# Patient Record
Sex: MALE | Race: WHITE | URBAN - NONMETROPOLITAN AREA
[De-identification: names, ages, dates, MRNs, and addresses within clinical notes are randomized per-mention and may not be internally consistent; named-entity substitution may affect disease eponyms.]

---

## 2017-01-09 ENCOUNTER — COMMUNICATION - GICH (OUTPATIENT)
Dept: FAMILY MEDICINE | Facility: OTHER | Age: 67
End: 2017-01-09

## 2017-01-09 DIAGNOSIS — M54.9 DORSALGIA: ICD-10-CM

## 2017-07-17 ENCOUNTER — COMMUNICATION - GICH (OUTPATIENT)
Dept: FAMILY MEDICINE | Facility: OTHER | Age: 67
End: 2017-07-17

## 2017-07-17 DIAGNOSIS — M54.9 DORSALGIA: ICD-10-CM

## 2017-07-30 ENCOUNTER — COMMUNICATION - GICH (OUTPATIENT)
Dept: FAMILY MEDICINE | Facility: OTHER | Age: 67
End: 2017-07-30

## 2017-07-30 DIAGNOSIS — N40.0 ENLARGED PROSTATE WITHOUT LOWER URINARY TRACT SYMPTOMS (LUTS): ICD-10-CM

## 2017-08-06 ENCOUNTER — COMMUNICATION - GICH (OUTPATIENT)
Dept: FAMILY MEDICINE | Facility: OTHER | Age: 67
End: 2017-08-06

## 2017-08-06 DIAGNOSIS — N40.0 ENLARGED PROSTATE WITHOUT LOWER URINARY TRACT SYMPTOMS (LUTS): ICD-10-CM

## 2017-08-15 ENCOUNTER — COMMUNICATION - GICH (OUTPATIENT)
Dept: FAMILY MEDICINE | Facility: OTHER | Age: 67
End: 2017-08-15

## 2017-08-15 DIAGNOSIS — I10 ESSENTIAL (PRIMARY) HYPERTENSION: ICD-10-CM

## 2017-08-15 DIAGNOSIS — E78.2 MIXED HYPERLIPIDEMIA: ICD-10-CM

## 2017-08-21 ENCOUNTER — COMMUNICATION - GICH (OUTPATIENT)
Dept: FAMILY MEDICINE | Facility: OTHER | Age: 67
End: 2017-08-21

## 2017-08-21 DIAGNOSIS — E78.2 MIXED HYPERLIPIDEMIA: ICD-10-CM

## 2017-08-21 DIAGNOSIS — I50.22 CHRONIC SYSTOLIC CONGESTIVE HEART FAILURE (H): ICD-10-CM

## 2017-08-21 DIAGNOSIS — I10 ESSENTIAL (PRIMARY) HYPERTENSION: ICD-10-CM

## 2017-09-14 ENCOUNTER — COMMUNICATION - GICH (OUTPATIENT)
Dept: FAMILY MEDICINE | Facility: OTHER | Age: 67
End: 2017-09-14

## 2017-09-14 DIAGNOSIS — I10 ESSENTIAL (PRIMARY) HYPERTENSION: ICD-10-CM

## 2017-09-14 DIAGNOSIS — J44.9 CHRONIC OBSTRUCTIVE PULMONARY DISEASE (H): ICD-10-CM

## 2017-09-21 ENCOUNTER — COMMUNICATION - GICH (OUTPATIENT)
Dept: FAMILY MEDICINE | Facility: OTHER | Age: 67
End: 2017-09-21

## 2017-09-21 DIAGNOSIS — M54.9 DORSALGIA: ICD-10-CM

## 2017-09-28 ENCOUNTER — HISTORY (OUTPATIENT)
Dept: FAMILY MEDICINE | Facility: OTHER | Age: 67
End: 2017-09-28

## 2017-09-28 ENCOUNTER — OFFICE VISIT - GICH (OUTPATIENT)
Dept: FAMILY MEDICINE | Facility: OTHER | Age: 67
End: 2017-09-28

## 2017-09-28 DIAGNOSIS — I10 ESSENTIAL (PRIMARY) HYPERTENSION: ICD-10-CM

## 2017-09-28 DIAGNOSIS — I50.31 ACUTE DIASTOLIC HEART FAILURE (H): ICD-10-CM

## 2017-09-28 DIAGNOSIS — N40.0 ENLARGED PROSTATE WITHOUT LOWER URINARY TRACT SYMPTOMS (LUTS): ICD-10-CM

## 2017-09-28 DIAGNOSIS — E78.2 MIXED HYPERLIPIDEMIA: ICD-10-CM

## 2017-09-28 DIAGNOSIS — I50.22 CHRONIC SYSTOLIC CONGESTIVE HEART FAILURE (H): ICD-10-CM

## 2017-09-28 LAB
A/G RATIO - HISTORICAL: 1.8 (ref 1–2)
ABSOLUTE BASOPHILS - HISTORICAL: 0 THOU/CU MM
ABSOLUTE EOSINOPHILS - HISTORICAL: 0.1 THOU/CU MM
ABSOLUTE IMMATURE GRANULOCYTES(METAS,MYELOS,PROS) - HISTORICAL: 0 THOU/CU MM
ABSOLUTE LYMPHOCYTES - HISTORICAL: 1.8 THOU/CU MM (ref 0.9–2.9)
ABSOLUTE MONOCYTES - HISTORICAL: 0.8 THOU/CU MM
ABSOLUTE NEUTROPHILS - HISTORICAL: 3.2 THOU/CU MM (ref 1.7–7)
ALBUMIN SERPL-MCNC: 4.5 G/DL (ref 3.5–5.7)
ALP SERPL-CCNC: 46 IU/L (ref 34–104)
ALT (SGPT) - HISTORICAL: 28 IU/L (ref 7–52)
ANION GAP - HISTORICAL: 12 (ref 5–18)
AST SERPL-CCNC: 32 IU/L (ref 13–39)
BASOPHILS # BLD AUTO: 0.7 %
BILIRUB SERPL-MCNC: 0.5 MG/DL (ref 0.3–1)
BUN SERPL-MCNC: 14 MG/DL (ref 7–25)
BUN/CREAT RATIO - HISTORICAL: 15
CALCIUM SERPL-MCNC: 10 MG/DL (ref 8.6–10.3)
CHLORIDE SERPLBLD-SCNC: 102 MMOL/L (ref 98–107)
CHOL/HDL RATIO - HISTORICAL: 4.02
CHOLESTEROL TOTAL: 213 MG/DL
CO2 SERPL-SCNC: 24 MMOL/L (ref 21–31)
CREAT SERPL-MCNC: 0.95 MG/DL (ref 0.7–1.3)
EOSINOPHIL NFR BLD AUTO: 1.5 %
ERYTHROCYTE [DISTWIDTH] IN BLOOD BY AUTOMATED COUNT: 15.4 % (ref 11.5–15.5)
GFR IF NOT AFRICAN AMERICAN - HISTORICAL: >60 ML/MIN/1.73M2
GLOBULIN - HISTORICAL: 2.5 G/DL (ref 2–3.7)
GLUCOSE SERPL-MCNC: 111 MG/DL (ref 70–105)
HCT VFR BLD AUTO: 38.8 % (ref 37–53)
HDLC SERPL-MCNC: 53 MG/DL (ref 23–92)
HEMOGLOBIN: 12.8 G/DL (ref 13.5–17.5)
IMMATURE GRANULOCYTES(METAS,MYELOS,PROS) - HISTORICAL: 0.5 %
LDLC SERPL CALC-MCNC: 101 MG/DL
LYMPHOCYTES NFR BLD AUTO: 30.4 % (ref 20–44)
MCH RBC QN AUTO: 32.4 PG (ref 26–34)
MCHC RBC AUTO-ENTMCNC: 33 G/DL (ref 32–36)
MCV RBC AUTO: 98 FL (ref 80–100)
MONOCYTES NFR BLD AUTO: 13.3 %
NEUTROPHILS NFR BLD AUTO: 53.6 % (ref 42–72)
NON-HDL CHOLESTEROL - HISTORICAL: 160 MG/DL
PLATELET # BLD AUTO: 241 THOU/CU MM (ref 140–440)
PMV BLD: 9.1 FL (ref 6.5–11)
POTASSIUM SERPL-SCNC: 4.5 MMOL/L (ref 3.5–5.1)
PROT SERPL-MCNC: 7 G/DL (ref 6.4–8.9)
PROVIDER ORDERDED STATUS - HISTORICAL: ABNORMAL
RED BLOOD COUNT - HISTORICAL: 3.95 MIL/CU MM (ref 4.3–5.9)
SODIUM SERPL-SCNC: 138 MMOL/L (ref 133–143)
TRIGL SERPL-MCNC: 294 MG/DL
WHITE BLOOD COUNT - HISTORICAL: 6 THOU/CU MM (ref 4.5–11)

## 2017-10-18 ENCOUNTER — AMBULATORY - GICH (OUTPATIENT)
Dept: SCHEDULING | Facility: OTHER | Age: 67
End: 2017-10-18

## 2017-10-19 ENCOUNTER — COMMUNICATION - GICH (OUTPATIENT)
Dept: FAMILY MEDICINE | Facility: OTHER | Age: 67
End: 2017-10-19

## 2017-10-19 DIAGNOSIS — M54.9 DORSALGIA: ICD-10-CM

## 2017-12-13 ENCOUNTER — COMMUNICATION - GICH (OUTPATIENT)
Dept: FAMILY MEDICINE | Facility: OTHER | Age: 67
End: 2017-12-13

## 2017-12-13 DIAGNOSIS — J44.9 CHRONIC OBSTRUCTIVE PULMONARY DISEASE (H): ICD-10-CM

## 2017-12-28 NOTE — TELEPHONE ENCOUNTER
Patient Information     Patient Name MRN Sex DOB Barthel, Donald [id number] Male 1950      Telephone Encounter by Tabatha Bello RN at 2017  8:36 AM     Author:  Tabatha Bello RN Service:  (none) Author Type:  NURS- Registered Nurse     Filed:  2017  8:38 AM Encounter Date:  2017 Status:  Signed     :  Tabatha Bello RN (NURS- Registered Nurse)            Redundant Refill Request refused;  Medication:alfuzosin (UROXATRAL) 10 mg Sustained-Release tablet  Prescription #:[id number]    Qty:90 tablet   Ref:0  Start:2017       Sig:TAKE 1 TABLET BY MOUTH ONCE DAILY WITH A MEAL.    Unable to complete prescription refill per RN Medication Refill Policy.................... Tabatha Bello RN ....................  2017   8:38 AM

## 2017-12-28 NOTE — TELEPHONE ENCOUNTER
Patient Information     Patient Name MRN Sex DOB Barthel, Donald [id number] Male 1950      Telephone Encounter by Tabatha Bello RN at 2017 10:49 AM     Author:  Tabatha Bello RN Service:  (none) Author Type:  NURS- Registered Nurse     Filed:  2017 10:51 AM Encounter Date:  2017 Status:  Signed     :  Tabatha Bello RN (NURS- Registered Nurse)            [facility]    Office visit in the past 12 months or per provider note.    Last visit with AD LUNDBERG was on: 2016 in [facility]  Next visit with AD LUNDBERG is on: No future appointment listed with this provider  Next visit with Family Practice is on: No future appointment listed in this department    Max refill for 12 months from last office visit or per provider note.    Prescription refilled per RN Medication Refill Policy.................... Tabatha Bello RN ....................  2017   10:50 AM

## 2017-12-28 NOTE — TELEPHONE ENCOUNTER
Patient Information     Patient Name MRN Sex DOB Barthel, Donald [id number] Male 1950      Telephone Encounter by Tabatha Bello RN at 2017  8:49 AM     Author:  Tabatha Bello RN Service:  (none) Author Type:  NURS- Registered Nurse     Filed:  2017  9:01 AM Encounter Date:  2017 Status:  Signed     :  Tabatha Bello RN (NURS- Registered Nurse)            Office visit in the past 12 months or per provider note.    Last visit with AD LUNDBERG was on: 2016 in [facility]  Next visit with AD LUNDBERG is on: 2017 in [facility]  Next visit with Family Practice is on: 2017 in [facility]    Max refill for 12 months from last office visit or per provider note.    Patient is due for medication management appointment. Limited refill provided at this time. Upcoming appointment noted. Prescription refilled per RN Medication Refill Policy.................... Tabatha Bello RN ....................  2017   9:00 AM

## 2017-12-28 NOTE — PROGRESS NOTES
Patient Information     Patient Name MRN Sex DOB Barthel, Donald [id number] Male 1950      Progress Notes by Karthik Rosales MD at 2017 10:45 AM     Author:  Karthik Rosales MD Service:  (none) Author Type:  Physician     Filed:  2017 11:36 AM Encounter Date:  2017 Status:  Signed     :  Karthik Rosales MD (Physician)            Nursing Notes:   Loretta Calderon MAGNUS  2017 11:21 AM  Signed  Patient is here today for a med check up and refills, is wondering if he can increase his lasix. Loretta Calderon LPN......................2017 11:07 AM    Donald Barthel is a 67 y.o. male who presents for   Chief Complaint     Patient presents with       Medication Management      med refills and check up     HPI: Mr. Barthel comes in requesting med review. He is doing OK but he does not exercise much as hehas hip pain with activity. He has lost 7 # since a year ago but has a large belly. The combivent helps his breathing. Blood pressure is low and he does get light headed and tired. He denies edema. He uses CPAP. He has no orthopnea. Denies chest pain.   Past Medical History:     Diagnosis  Date     COPD (chronic obstructive pulmonary disease) (HC)     History of - poorly responsive to bronchodilators in the past      Dilated cardiomyopathy (HC)     without coronary disease, [facility]        Spondylolisthesis     L4-5 - operated, chronic back pain.      Past Surgical History:      Procedure  Laterality Date     CATARACT REMOVAL       CT CORONARY ANGIOGRAM (IA)      negative [facility].       CVL CORONARY ANGIOGRAM   10/2010     normal, no comment on the resynchronization therapy or ICD request.          LUMBAR FUSION  05    L5 foraminotomy and L5-6 fusion        Family History       Problem   Relation Age of Onset     Other  Other      No family history of coronary artery disease or diabetes.       Other  Brother      esophageal        Current Outpatient Prescriptions       Medication   Sig Dispense Refill     alfuzosin (UROXATRAL) 10 mg Sustained-Release tablet TAKE 1 TABLET BY MOUTH ONCE DAILY WITH A MEAL. 90 tablet 0     atorvastatin (LIPITOR) 20 mg tablet Take 1 tablet by mouth at bedtime. 90 tablet 0     COMBIVENT RESPIMAT  mcg/actuation inhaler INHALE 1 PUFF BY MOUTH 4 TIMES DAILY. 12 g 0     furosemide (LASIX) 20 mg tablet TAKE 2 TABLET BY MOUTH EACH MORNING AND 1 TABLET AT 2PM IN THE AFTERNOON. 270 tablet 0     lisinopril (PRINIVIL; ZESTRIL) 10 mg tablet Take 0.5 tablets by mouth 2 times daily. 90 tablet 0     MAPAP EXTRA STRENGTH 500 mg tablet TAKE AS DIRECTED *MAX OF 6 CAPLETS PER DAY* 100 tablet 0     metoprolol succinate (TOPROL XL) 50 mg sustained-release tablet TAKE 1 TABLET BY MOUTH TWICE A  tablet 0     sildenafil citrate (VIAGRA) 100 mg tablet Take 1 tablet by mouth as needed        No current facility-administered medications for this visit.      Medications have been reviewed by me and are current to the best of my knowledge and ability.    No Known Allergies  REVIEW OF SYSTEMS:  Respiratory: HARO  Cardiovascular:denies chest pain    Genitourinary:Negative     EXAM:   Vitals:     09/28/17 1111   BP: 100/78   Pulse: 72   Temp: 96.9  F (36.1  C)   TempSrc: Temporal   Weight: 93.2 kg (205 lb 6.4 oz)     General Appearance: Pleasant, alert, appropriate appearance for age. No acute distress  Chest/Respiratory Exam: distant sounds  Cardiovascular Exam: Regular rate and rhythm. S1, S2, no murmur, click, gallop, or rubs.  ASSESSMENT AND PLAN:  1. Benign prostatic hyperplasia, presence of lower urinary tract symptoms unspecified, unspecified morphology    - alfuzosin (UROXATRAL) 10 mg Sustained-Release tablet; Take 1 tablet by mouth once daily with a meal.  Dispense: 90 tablet; Refill: 0    2. Mixed hyperlipidemia  Lab refill  - atorvastatin (LIPITOR) 20 mg tablet; Take 1 tablet by mouth at bedtime.  Dispense: 90 tablet; Refill: 0    3. HYPERTENSION  Reduce B blocker dose  -  lisinopril (PRINIVIL; ZESTRIL) 10 mg tablet; Take 0.5 tablets by mouth 2 times daily.  Dispense: 90 tablet; Refill: 0  - metoprolol succinate (TOPROL XL) 50 mg sustained-release tablet; Take 1 tablet by mouth 2 times daily.  Dispense: 180 tablet; Refill: 0    4. Chronic systolic heart failure - ECHO 9/21/2010 - EF 15-20%  Stable   - furosemide (LASIX) 20 mg tablet; Take  by mouth.  Dispense: 270 tablet; Refill: 0

## 2017-12-28 NOTE — TELEPHONE ENCOUNTER
Patient Information     Patient Name MRN Sex DOB Barthel, Donald [id number] Male 1950      Telephone Encounter by Tabatha Bello RN at 2017  2:26 PM     Author:  Tabatha Bello RN Service:  (none) Author Type:  NURS- Registered Nurse     Filed:  2017  2:27 PM Encounter Date:  2017 Status:  Signed     :  Tabatha Bello RN (NURS- Registered Nurse)            Office visit in the past 12 months or per provider note.    Last visit with AD LUNDBERG was on: 2016 in [facility]  Next visit with AD LUNDBERG is on: No future appointment listed with this provider  Next visit with Family Practice is on: No future appointment listed in this department    Max refill for 12 months from last office visit or per provider note.    Prescription refilled per RN Medication Refill Policy.................... Tabatha Bello RN ....................  2017   2:27 PM

## 2017-12-28 NOTE — TELEPHONE ENCOUNTER
Patient Information     Patient Name MRN Sex DOB Barthel, Donald [id number] Male 1950      Telephone Encounter by Tabatha Bello RN at 10/23/2017 10:26 AM     Author:  Tabatha Bello RN Service:  (none) Author Type:  NURS- Registered Nurse     Filed:  10/23/2017 10:28 AM Encounter Date:  10/19/2017 Status:  Signed     :  Tabatha Bello RN (NURS- Registered Nurse)            Office visit in the past 12 months or per provider note.    Last visit with AD LUNDBERG was on: 2017 in [facility]  Next visit with AD LUNDBERG is on: No future appointment listed with this provider  Next visit with Family Practice is on: No future appointment listed in this department    Max refill for 12 months from last office visit or per provider note.    Prescription refilled per RN Medication Refill Policy.................... Tabatha Bello RN ....................  10/23/2017   10:27 AM

## 2017-12-28 NOTE — TELEPHONE ENCOUNTER
Patient Information     Patient Name MRN Sex DOB Barthel, Donald [id number] Male 1950      Telephone Encounter by Katarina Acuna RN at 2017  9:02 AM     Author:  Katarina Acuna RN Service:  (none) Author Type:  NURS- Registered Nurse     Filed:  2017  9:07 AM Encounter Date:  2017 Status:  Signed     :  Katarina Acuna RN (NURS- Registered Nurse)            Nebulizers    Office visit in the past 12 months.    Last visit with AD LUNDBERG was on: 2016 in [facility]  Next visit with AD LUNDBERG is on: No future appointment listed with this provider  Next visit with Family Practice is on: No future appointment listed in this department    Max refills 12 months from last office visit.    Beta Blockers     Office visit in the past 12 months or per provider note.    Last visit with AD LUNDBERG was on: 2016 in [facility]  Next visit with AD LUNDBERG is on: No future appointment listed with this provider  Next visit with Family Practice is on: No future appointment listed in this department    Max refill for 12 months from last office visit or per provider note.    Patient is due for medication management appointment. Limited refill provided at this time. [employer] message and/or letter sent for reminder to patient. Prescription refilled per RN Medication Refill Policy.................... Katarina Acuna RN ....................  2017   9:04 AM

## 2017-12-28 NOTE — TELEPHONE ENCOUNTER
Patient Information     Patient Name MRN Sex DOB Barthel, Donald [id number] Male 1950      Telephone Encounter by Tabatha Bello RN at 2017  8:07 AM     Author:  Tabatha Bello RN Service:  (none) Author Type:  NURS- Registered Nurse     Filed:  2017  8:10 AM Encounter Date:  2017 Status:  Signed     :  Tabatha Bello RN (NURS- Registered Nurse)            Diuretics (may be prescribed for edema)    Office visit in the past 12 months or per provider note.    Last visit with AD LUNDBERG was on: 2016 in [facility]  Next visit with AD LUNDBERG is on: No future appointment listed with this provider  Next visit with Family Practice is on: No future appointment listed in this department    Lab testing requirements:  Creatinine and Potassium annually, if ordering lab, order BMP.  CREATININE (mg/dL)    Date Value   2016 0.93     POTASSIUM (mmol/L)    Date Value   2016 3.8     BP Readings from Last 4 Encounters:    16 122/62   06/16/15 120/74   10/23/14 128/80   14 140/78       Review last provider visit note.  If BP reviewed and plan is noted, can refill.  Max refill for 12 months from last office visit or per provider note.    Ace Inhibitors    Office visit in the past 12 months or per provider note.    Lab test requirements:  Creatinine and Potassium annually, if ordering lab, order BMP.  CREATININE (mg/dL)    Date Value   2016 0.93     POTASSIUM (mmol/L)    Date Value   2016 3.8       Max refill for 12 months from last office visit or per provider note    Statins    Office visit in the past 12 months.    Last Lipids:  Chol: 186    2015  T    2015  HDL:   46    2015  LDL:  138    10/23/2014  LDL DIRECT:  No results found in past 5 years    .    Concommitant use of fibrates and statins-If it is an addition to the medication list, review note and/or discuss with provider.  If already on  medication list, refill.    Max refills 12 months from last office visit.      Patient is due for medication management appointment. Limited refill provided at this time. [employer] message and/or letter sent for reminder to patient. Prescription refilled per RN Medication Refill Policy.................... Tabatha Bello RN ....................  8/22/2017   8:10 AM

## 2017-12-28 NOTE — TELEPHONE ENCOUNTER
Patient Information     Patient Name MRN Sex DOB Barthel, Donald [id number] Male 1950      Telephone Encounter by Michele Ulloa RN at 2017 10:04 AM     Author:  Michele Ulloa RN Service:  (none) Author Type:  NURS- Registered Nurse     Filed:  2017 10:05 AM Encounter Date:  8/15/2017 Status:  Signed     :  Michele Ulloa RN (NURS- Registered Nurse)            Refilled 17, #90.  Unable to complete prescription refill per RN Medication Refill Policy.................... MICHELE ULLOA RN ....................  2017   10:04 AM

## 2017-12-30 NOTE — NURSING NOTE
Patient Information     Patient Name MRN Sex DOB Barthel, Donald [id number] Male 1950      Nursing Note by Loretta Calderon at 2017 10:45 AM     Author:  Loretta Calderon Service:  (none) Author Type:  (none)     Filed:  2017 11:21 AM Encounter Date:  2017 Status:  Signed     :  Loretta Calderon            Patient is here today for a med check up and refills, is wondering if he can increase his lasix. Loretta Calderon LPN......................2017 11:07 AM

## 2018-01-02 NOTE — TELEPHONE ENCOUNTER
Patient Information     Patient Name MRN Sex DOB Barthel, Donald [id number] Male 1950      Telephone Encounter by Tabatha Bello RN at 2017  2:28 PM     Author:  Tabatha Blelo RN Service:  (none) Author Type:  NURS- Registered Nurse     Filed:  2017  2:30 PM Encounter Date:  2017 Status:  Signed     :  Tabatha Bello RN (NURS- Registered Nurse)            Office visit in the past 12 months or per provider note.    Last visit with AD LUNDBERG was on: 2016 in [facility]  Next visit with AD LUNDBERG is on: No future appointment listed with this provider  Next visit with Family Practice is on: No future appointment listed in this department    Max refill for 12 months from last office visit or per provider note.    Prescription refilled per RN Medication Refill Policy.................... Tabatha Bello RN ....................  2017   2:28 PM

## 2018-01-23 ENCOUNTER — DOCUMENTATION ONLY (OUTPATIENT)
Dept: FAMILY MEDICINE | Facility: OTHER | Age: 68
End: 2018-01-23

## 2018-01-23 PROBLEM — I10 HYPERTENSION: Status: ACTIVE | Noted: 2018-01-23

## 2018-01-23 PROBLEM — G47.33 OSA ON CPAP: Status: ACTIVE | Noted: 2018-01-23

## 2018-01-23 RX ORDER — ALFUZOSIN HYDROCHLORIDE 10 MG/1
10 TABLET, EXTENDED RELEASE ORAL
COMMUNITY
Start: 2017-09-28 | End: 2018-10-18

## 2018-01-23 RX ORDER — LISINOPRIL 10 MG/1
5 TABLET ORAL
COMMUNITY
Start: 2017-09-28 | End: 2018-10-18

## 2018-01-23 RX ORDER — METOPROLOL SUCCINATE 50 MG/1
25 TABLET, EXTENDED RELEASE ORAL
COMMUNITY
Start: 2017-09-28 | End: 2018-10-18

## 2018-01-23 RX ORDER — ATORVASTATIN CALCIUM 20 MG/1
20 TABLET, FILM COATED ORAL AT BEDTIME
COMMUNITY
Start: 2017-09-28 | End: 2018-10-18

## 2018-01-23 RX ORDER — SILDENAFIL 100 MG/1
1 TABLET, FILM COATED ORAL
COMMUNITY
End: 2018-10-22

## 2018-01-23 RX ORDER — FUROSEMIDE 20 MG
TABLET ORAL
COMMUNITY
Start: 2017-09-28 | End: 2018-10-18

## 2018-01-23 RX ORDER — ACETAMINOPHEN 500 MG
TABLET ORAL
COMMUNITY
Start: 2017-10-23

## 2018-01-25 VITALS
HEART RATE: 72 BPM | DIASTOLIC BLOOD PRESSURE: 78 MMHG | SYSTOLIC BLOOD PRESSURE: 100 MMHG | BODY MASS INDEX: 35.81 KG/M2 | WEIGHT: 205.4 LBS | TEMPERATURE: 96.9 F

## 2018-02-12 NOTE — TELEPHONE ENCOUNTER
Patient Information     Patient Name MRN Sex DOB Barthel, Donald [id number] Male 1950      Telephone Encounter by Tabatha Bello RN at 2017  9:59 AM     Author:  Tabatha Bello RN Service:  (none) Author Type:  NURS- Registered Nurse     Filed:  2017 10:00 AM Encounter Date:  2017 Status:  Signed     :  Tabatha Bello RN (NURS- Registered Nurse)            Nebulizers    Office visit in the past 12 months.    Last visit with AD LUNDBERG was on: 2017 in [facility]  Next visit with AD LUNDBERG is on: No future appointment listed with this provider  Next visit with Family Practice is on: No future appointment listed in this department    Max refills 12 months from last office visit.    Prescription refilled per RN Medication Refill Policy.................... Tabatha Bello RN ....................  2017   10:00 AM

## 2018-07-23 NOTE — PROGRESS NOTES
Patient Information     Patient Name  Barthel, Donald MRN  [id number] Sex  Male   1950      Letter by Karthik Rosales MD at      Author:  Karthik Rosales MD Service:  (none) Author Type:  (none)    Filed:   Encounter Date:  2017 Status:  (Other)           Donald Barthel  [address] [address]          2017    Dear Mr. Barthel:    This letter is to remind you that you are due for your annual exam with Karthik Rosales MD. Your last comprehensive visit was more than 12 months ago.    LIMITED refills of         COMBIVENT RESPIMAT  mcg/actuation inhaler      metoprolol succinate (TOPROL XL) 50 mg sustained-release tablet   have been called into your pharmacy. Additional refills require you to complete this appointment.    Please call the clinic at [phone] to schedule your appointment.    If you should require additional refills before your scheduled appointment, please contact your pharmacy and we will refill your medication until the date of your appointment.    If you are no longer seeing Karthik Rosales MD for primary care, please call to let us know. Doing so will remove you from our call/contact list.    Thank you for choosing [facility] and [facility] for your health care needs.    Sincerely,    Refill RN  [facility]

## 2018-07-23 NOTE — PROGRESS NOTES
Patient Information     Patient Name  Barthel, Donald MRN  [id number] Sex  Male   1950      Letter by Karthik Rosales MD at      Author:  Karthik Rosales MD Service:  (none) Author Type:  (none)    Filed:   Encounter Date:  2017 Status:  (Other)           Donald Barthel  [address] [address]          2017    Dear Mr. Barthel:    Your recent labs are all satisfactory.  Karthik Rosales MD ....................  2017   3:42 PM     Results for orders placed or performed in visit on 17       COMPLETE METABOLIC PANEL       Result  Value Ref Range Status    SODIUM 138 133 - 143 mmol/L Final    POTASSIUM 4.5 3.5 - 5.1 mmol/L Final    CHLORIDE 102 98 - 107 mmol/L Final    CO2,TOTAL 24 21 - 31 mmol/L Final    ANION GAP 12 5 - 18                 Final    GLUCOSE 111 (H) 70 - 105 mg/dL Final    CALCIUM 10.0 8.6 - 10.3 mg/dL Final    BUN 14 7 - 25 mg/dL Final    CREATININE 0.95 0.70 - 1.30 mg/dL Final    BUN/CREAT RATIO           15                 Final    GFR if African American >60 >60 ml/min/1.73m2 Final    GFR if not African American >60 >60 ml/min/1.73m2 Final    ALBUMIN 4.5 3.5 - 5.7 g/dL Final    PROTEIN,TOTAL 7.0 6.4 - 8.9 g/dL Final    GLOBULIN                  2.5 2.0 - 3.7 g/dL Final    A/G RATIO 1.8 1.0 - 2.0                 Final    BILIRUBIN,TOTAL 0.5 0.3 - 1.0 mg/dL Final    ALK PHOSPHATASE 46 34 - 104 IU/L Final    ALT (SGPT) 28 7 - 52 IU/L Final    AST (SGOT) 32 13 - 39 IU/L Final   LIPID PANEL       Result  Value Ref Range Status    CHOLESTEROL,TOTAL 213 (H) <200 mg/dL Final    TRIGLYCERIDES 294 (H) <150 mg/dL Final    HDL CHOLESTEROL 53 23 - 92 mg/dL Final    NON-HDL CHOLESTEROL 160 (H) <145 mg/dl Final    CHOL/HDL RATIO            4.02 <4.50                 Final    LDL CHOLESTEROL 101 (H) <100 mg/dL Final    PROVIDER ORDERED STATUS RANDOM  Final   CBC WITH AUTO DIFFERENTIAL       Result  Value Ref Range Status    WHITE BLOOD COUNT          6.0 4.5 - 11.0 thou/cu mm Final    RED BLOOD COUNT           3.95 (L) 4.30 - 5.90 mil/cu mm Final    HEMOGLOBIN                12.8 (L) 13.5 - 17.5 g/dL Final    HEMATOCRIT                38.8 37.0 - 53.0 % Final    MCV                       98 80 - 100 fL Final    MCH                       32.4 26.0 - 34.0 pg Final    MCHC                      33.0 32.0 - 36.0 g/dL Final    RDW                       15.4 11.5 - 15.5 % Final    PLATELET COUNT            241 140 - 440 thou/cu mm Final    MPV                       9.1 6.5 - 11.0 fL Final    NEUTROPHILS               53.6 42.0 - 72.0 % Final    LYMPHOCYTES               30.4 20.0 - 44.0 % Final    MONOCYTES                 13.3 (H) <12.0 % Final    EOSINOPHILS               1.5 <8.0 % Final    BASOPHILS                 0.7 <3.0 % Final    IMMATURE GRANULOCYTES(METAS,MYELOS,PROS) 0.5 % Final    ABSOLUTE NEUTROPHILS      3.2 1.7 - 7.0 thou/cu mm Final    ABSOLUTE LYMPHOCYTES      1.8 0.9 - 2.9 thou/cu mm Final    ABSOLUTE MONOCYTES        0.8 <0.9 thou/cu mm Final    ABSOLUTE EOSINOPHILS      0.1 <0.5 thou/cu mm Final    ABSOLUTE BASOPHILS        0.0 <0.3 thou/cu mm Final    ABSOLUTE IMMATURE GRANULOCYTES(METAS,MYELOS,PROS) 0.0 <=0.3 thou/cu mm Final

## 2018-07-24 NOTE — PROGRESS NOTES
Patient Information     Patient Name  Barthel, Donald MRN  [id number] Sex  Male   1950      Letter by Karthik Rosales MD at      Author:  Karthik Rosales MD Service:  (none) Author Type:  (none)    Filed:   Encounter Date:  2017 Status:  (Other)           Donald Barthel  [address] [facility] [address]          2017    Dear Mr. Barthel:    This letter is to remind you that you are due for your annual exam with Karthik Rosales MD. Your last comprehensive visit was 2016.    LIMITED refills of       furosemide (LASIX) 20 mg tablet      atorvastatin (LIPITOR) 20 mg tablet      lisinopril (PRINIVIL; ZESTRIL) 10 mg tablet   have been called into your pharmacy. Additional refills require you to complete this appointment.    Please call the clinic at [phone] to schedule your appointment for on or after 2017.    If you should require additional refills before your scheduled appointment, please contact your pharmacy and we will refill your medication until the date of your appointment.    If you are no longer seeing Karthik Rosales MD for primary care, please call to let us know. Doing so will remove you from our call/contact list.    Thank you for choosing [facility] and [facility] for your health care needs.    Sincerely,    Refill RN  [facility]

## 2018-07-27 DIAGNOSIS — J44.9 COPD (CHRONIC OBSTRUCTIVE PULMONARY DISEASE) (H): Primary | ICD-10-CM

## 2018-07-30 RX ORDER — IPRATROPIUM BROMIDE AND ALBUTEROL 20; 100 UG/1; UG/1
SPRAY, METERED RESPIRATORY (INHALATION)
Qty: 12 G | Refills: 1 | Status: SHIPPED | OUTPATIENT
Start: 2018-07-30

## 2018-07-30 NOTE — TELEPHONE ENCOUNTER
"Refill request from  for:  COMBIVENT RESPIMAT  MCG/ACT inhaler     LOV 9/28/2017 with Karthik Rosales MD-reviewed    Last refilled 12/14/2017 with 2 refills  Letter out 9/28/2017-no changes at that time.    Refilled   Requested Prescriptions   Pending Prescriptions Disp Refills     COMBIVENT RESPIMAT  MCG/ACT inhaler [Pharmacy Med Name: Combivent Respimat 20 mcg-100 mcg/actuation solution for inhalation] 12 g      Sig: INHALE 1 PUFF BY MOUTH 4 TIMES DAILY.    Asthma Maintenance Inhalers - Anticholinergics Passed    7/27/2018  1:04 AM       Passed - Patient is age 12 years or older       Passed - Recent (12 mo) or future (30 days) visit within the authorizing provider's specialty    Patient had office visit in the last 12 months or has a visit in the next 30 days with authorizing provider or within the authorizing provider's specialty.  See \"Patient Info\" tab in inbasket, or \"Choose Columns\" in Meds & Orders section of the refill encounter.          Cee Mike RN  ....................  7/30/2018   2:48 PM      "

## 2018-10-18 DIAGNOSIS — N40.0 BENIGN PROSTATIC HYPERPLASIA, PRESENCE OF LOWER URINARY TRACT SYMPTOMS UNSPECIFIED: ICD-10-CM

## 2018-10-18 DIAGNOSIS — I50.22 CHRONIC SYSTOLIC HEART FAILURE (H): Primary | ICD-10-CM

## 2018-10-18 DIAGNOSIS — I10 ESSENTIAL (PRIMARY) HYPERTENSION: ICD-10-CM

## 2018-10-18 DIAGNOSIS — E78.2 MIXED HYPERLIPIDEMIA: ICD-10-CM

## 2018-10-22 RX ORDER — LISINOPRIL 10 MG/1
TABLET ORAL
Qty: 30 TABLET | Refills: 0 | Status: SHIPPED | OUTPATIENT
Start: 2018-10-22 | End: 2018-11-21

## 2018-10-22 RX ORDER — ALFUZOSIN HYDROCHLORIDE 10 MG/1
TABLET, EXTENDED RELEASE ORAL
Qty: 60 TABLET | Refills: 0 | Status: SHIPPED | OUTPATIENT
Start: 2018-10-22 | End: 2018-11-21

## 2018-10-22 RX ORDER — FUROSEMIDE 20 MG
TABLET ORAL
Qty: 180 TABLET | Refills: 0 | Status: SHIPPED | OUTPATIENT
Start: 2018-10-22

## 2018-10-22 RX ORDER — ATORVASTATIN CALCIUM 20 MG/1
TABLET, FILM COATED ORAL
Qty: 60 TABLET | Refills: 0 | Status: SHIPPED | OUTPATIENT
Start: 2018-10-22

## 2018-10-22 RX ORDER — METOPROLOL SUCCINATE 50 MG/1
TABLET, EXTENDED RELEASE ORAL
Qty: 30 TABLET | Refills: 0 | Status: SHIPPED | OUTPATIENT
Start: 2018-10-22 | End: 2018-11-21

## 2018-10-22 NOTE — TELEPHONE ENCOUNTER
"Refill request from  for:  metoprolol succinate (TOPROL-XL) 50 MG 24 hr tablet  atorvastatin (LIPITOR) 20 MG tablet  lisinopril (PRINIVIL/ZESTRIL) 10 MG tablet  furosemide (LASIX) 20 MG tablet  alfuzosin (UROXATRAL) 10 MG 24 hr tablet    LOV 9/28/2018 with Dr. Rosales  No upcoming appt noted at this time.  Labs \"satisfactory\"    Metoprolol states take 1 tablet by mouth 2 X daily.  Request states 1/2 tablet by mouth 2 X daily.    Contacted patient and they share that they have been advised by PCP to take 1/2 tablet 2 X daily and that is what has been working for them for a long time.  States BP is stable and WNL.      Medication listed as 1/2 tablet 2 X daily on active med list    Patient will call and schedule an appointment with Dr. Duarte for medication management, labs, and to establish care.  He also has questions regarding inhaler and would like to discuss increasing/changing the medication at that time as he feels it does not work as well as it should.  Offered to transfer to scheduling.  Patient declined at this time but states he will call soon to schedule appt for November or will contact us sooner if needed.  Relayed to patient that would fill for 60 days.      Noted contraindication with sildenafil (Viagra).  Medication listed as historil in [employer] and  [employer].     Contacted patient and he shares he has never taken the sildenafil.    Medication removed from med list   Medication Detail      Disp Refills Start End ADITHYA   metoprolol succinate (TOPROL-XL) 50 MG 24 hr tablet   9/28/2017  --   Sig - Route: Take 25 mg by mouth 2 times daily - Oral   Class: Historical   Order: [id number]         Medication Detail      Disp Refills Start End ADITHYA   metoprolol succinate (TOPROL XL) 50 mg sustained-release tablet 180 tablet 3 9/28/2017  No   Sig: Take 0.5 tablets by mouth 2 times daily.   Class: eRx   Route: Oral   E-Prescribing Status: Receipt confirmed by pharmacy (9/28/2017 11:35 AM CDT)       Requested " "Prescriptions   Pending Prescriptions Disp Refills     metoprolol succinate (TOPROL-XL) 50 MG 24 hr tablet [Pharmacy Med Name: METOPROLOL SUCC 50MG ER TAB] 180 tablet      Sig: TAKE 1/2 TABLET BY MOUTH TWICE A DAY    Beta-Blockers Protocol Failed    10/18/2018  1:08 AM       Failed - Blood pressure under 140/90 in past 12 months    BP Readings from Last 3 Encounters:   09/28/17 100/78   09/13/16 122/62   06/16/15 120/74          Failed - Recent (12 mo) or future (30 days) visit within the authorizing provider's specialty    Patient had office visit in the last 12 months or has a visit in the next 30 days with authorizing provider or within the authorizing provider's specialty.  See \"Patient Info\" tab in inbasket, or \"Choose Columns\" in Meds & Orders section of the refill encounter.           Passed - Patient is age 6 or older        atorvastatin (LIPITOR) 20 MG tablet [Pharmacy Med Name: ATORVASTATIN 20MG TABLET] 90 tablet      Sig: TAKE 1 TABLET BY MOUTH AT BEDTIME.    Statins Protocol Failed    10/18/2018  1:08 AM       Failed - LDL on file in past 12 months    Recent Labs   Lab Test  09/28/17   1235   LDL  101*          Failed - Recent (12 mo) or future (30 days) visit within the authorizing provider's specialty    Patient had office visit in the last 12 months or has a visit in the next 30 days with authorizing provider or within the authorizing provider's specialty.  See \"Patient Info\" tab in inbasket, or \"Choose Columns\" in Meds & Orders section of the refill encounter.           Passed - No abnormal creatine kinase in past 12 months    No lab results found.          Passed - Patient is age 18 or older        lisinopril (PRINIVIL/ZESTRIL) 10 MG tablet [Pharmacy Med Name: LISINOPRIL 10MG TAB] 90 tablet      Sig: TAKE 1/2 TABLET BY MOUTH 2 TIMES DAILY.    ACE Inhibitors (Including Combos) Protocol Failed    10/18/2018  1:08 AM       Failed - Blood pressure under 140/90 in past 12 months    BP Readings from Last " "3 Encounters:   09/28/17 100/78   09/13/16 122/62   06/16/15 120/74          Failed - Recent (12 mo) or future (30 days) visit within the authorizing provider's specialty    Patient had office visit in the last 12 months or has a visit in the next 30 days with authorizing provider or within the authorizing provider's specialty.  See \"Patient Info\" tab in inbasket, or \"Choose Columns\" in Meds & Orders section of the refill encounter.           Failed - Normal serum creatinine on file in past 12 months    Recent Labs   Lab Test  09/28/17   1235   CR  0.95          Failed - Normal serum potassium on file in past 12 months    Recent Labs   Lab Test  09/28/17   1235   POTASSIUM  4.5          Passed - Patient is age 18 or older        furosemide (LASIX) 20 MG tablet [Pharmacy Med Name: FUROSEMIDE 20MG TAB] 270 tablet      Sig: TAKE 2 TABLETS BY MOUTH EVERY MORNING AND 1 TABLET EVERY EVENING    Diuretics (Including Combos) Protocol Failed    10/18/2018  1:08 AM       Failed - Blood pressure under 140/90 in past 12 months    BP Readings from Last 3 Encounters:   09/28/17 100/78   09/13/16 122/62   06/16/15 120/74          Failed - Recent (12 mo) or future (30 days) visit within the authorizing provider's specialty    Patient had office visit in the last 12 months or has a visit in the next 30 days with authorizing provider or within the authorizing provider's specialty.  See \"Patient Info\" tab in inbasket, or \"Choose Columns\" in Meds & Orders section of the refill encounter.             Failed - Normal serum creatinine on file in past 12 months    Recent Labs   Lab Test  09/28/17   1235   CR  0.95           Failed - Normal serum potassium on file in past 12 months    Recent Labs   Lab Test  09/28/17   1235   POTASSIUM  4.5           Failed - Normal serum sodium on file in past 12 months    Recent Labs   Lab Test  09/28/17   1235   NA  138           Passed - Patient is age 18 or older        alfuzosin (UROXATRAL) 10 MG 24 hr " "tablet [Pharmacy Med Name: ALFUZOSIN 10MG ER TABLET] 90 tablet      Sig: TAKE 1 TABLET BY MOUTH ONCE DAILY WITH A MEAL.    Alpha Blockers Failed    10/18/2018  1:08 AM       Failed - Blood pressure under 140/90 in past 12 months    BP Readings from Last 3 Encounters:   09/28/17 100/78   09/13/16 122/62   06/16/15 120/74          Failed - Recent (12 mo) or future (30 days) visit within the authorizing provider's specialty    Patient had office visit in the last 12 months or has a visit in the next 30 days with authorizing provider or within the authorizing provider's specialty.  See \"Patient Info\" tab in inbasket, or \"Choose Columns\" in Meds & Orders section of the refill encounter.         Failed - Patient does not have Tadalafil, Vardenafil, or Sildenafil on their medication list       Passed - Patient is 18 years of age or older        Cee Mike RN  ....................  10/22/2018   2:15 PM      "

## 2018-11-21 DIAGNOSIS — I10 ESSENTIAL (PRIMARY) HYPERTENSION: ICD-10-CM

## 2018-11-21 DIAGNOSIS — N40.0 BENIGN PROSTATIC HYPERPLASIA, PRESENCE OF LOWER URINARY TRACT SYMPTOMS UNSPECIFIED: ICD-10-CM

## 2018-11-21 NOTE — TELEPHONE ENCOUNTER
Patient has not scheduled an appointment with Dr Duarte.  Hellen Ragsdale CMA ([facility])................ 11/21/2018 12:44 PM

## 2018-11-21 NOTE — LETTER
November 26, 2018      Donald Barthel    [address] [address]        Dear Mr. Barthel,    Your pharmacy has requested a refill of some medications, which have been filled for a 30-day limited supply.     According to our records you are overdue for annual medication management and labs. Please schedule an annual visit and to establish care with Dr. Yan Duarte, if that is still your intention.     Your health is very important to us. Please contact our scheduling line at [phone] before your next medication refills are needed.     Thank you for choosing [facility] for your health care needs.     Sincerely,        The Refill Nurse  [facility]

## 2018-11-26 RX ORDER — METOPROLOL SUCCINATE 50 MG/1
TABLET, EXTENDED RELEASE ORAL
Qty: 30 TABLET | Refills: 0 | Status: SHIPPED | OUTPATIENT
Start: 2018-11-26

## 2018-11-26 RX ORDER — ALFUZOSIN HYDROCHLORIDE 10 MG/1
TABLET, EXTENDED RELEASE ORAL
Qty: 30 TABLET | Refills: 0 | Status: SHIPPED | OUTPATIENT
Start: 2018-11-26

## 2018-11-26 RX ORDER — LISINOPRIL 10 MG/1
TABLET ORAL
Qty: 30 TABLET | Refills: 0 | Status: SHIPPED | OUTPATIENT
Start: 2018-11-26

## 2018-11-26 NOTE — TELEPHONE ENCOUNTER
Routing refill request to provider for review/approval because:  Labs not current:  Creatinine, potassium  It has been more than 1 year since last office visit. LOV 9-28-17.     Per notes, patient stated his intention to establish with Dr. Yan Duarte in November. Reminder letter sent and pharmacy notified. Given 30 days for a full yasmine period. Katarina Galicia RN on 11/26/2018 at 9:10 AM